# Patient Record
Sex: MALE | Race: WHITE | NOT HISPANIC OR LATINO | Employment: FULL TIME | URBAN - METROPOLITAN AREA
[De-identification: names, ages, dates, MRNs, and addresses within clinical notes are randomized per-mention and may not be internally consistent; named-entity substitution may affect disease eponyms.]

---

## 2018-06-18 LAB — HBA1C MFR BLD: >14 % (ref 0–5.6)

## 2019-04-01 ENCOUNTER — OFFICE VISIT - HEALTHEAST (OUTPATIENT)
Dept: INTERNAL MEDICINE | Facility: CLINIC | Age: 54
End: 2019-04-01

## 2019-04-01 DIAGNOSIS — E11.9 TYPE 2 DIABETES MELLITUS WITHOUT COMPLICATION, WITHOUT LONG-TERM CURRENT USE OF INSULIN (H): ICD-10-CM

## 2019-04-01 DIAGNOSIS — Z12.11 SCREEN FOR COLON CANCER: ICD-10-CM

## 2019-04-01 DIAGNOSIS — R05.9 COUGH: ICD-10-CM

## 2019-04-01 LAB
ANION GAP SERPL CALCULATED.3IONS-SCNC: 10 MMOL/L (ref 5–18)
BUN SERPL-MCNC: 20 MG/DL (ref 8–22)
CALCIUM SERPL-MCNC: 10 MG/DL (ref 8.5–10.5)
CHLORIDE BLD-SCNC: 93 MMOL/L (ref 98–107)
CO2 SERPL-SCNC: 29 MMOL/L (ref 22–31)
CREAT SERPL-MCNC: 1.35 MG/DL (ref 0.7–1.3)
ERYTHROCYTE [DISTWIDTH] IN BLOOD BY AUTOMATED COUNT: 12.8 % (ref 11–14.5)
GFR SERPL CREATININE-BSD FRML MDRD: 56 ML/MIN/1.73M2
GLUCOSE BLD-MCNC: 435 MG/DL (ref 70–125)
HCT VFR BLD AUTO: 41.5 % (ref 40–54)
HGB BLD-MCNC: 14.2 G/DL (ref 14–18)
MCH RBC QN AUTO: 27.8 PG (ref 27–34)
MCHC RBC AUTO-ENTMCNC: 34.3 G/DL (ref 32–36)
MCV RBC AUTO: 81 FL (ref 80–100)
PLATELET # BLD AUTO: 316 THOU/UL (ref 140–440)
PMV BLD AUTO: 7.4 FL (ref 7–10)
POTASSIUM BLD-SCNC: 4.9 MMOL/L (ref 3.5–5)
RBC # BLD AUTO: 5.12 MILL/UL (ref 4.4–6.2)
SODIUM SERPL-SCNC: 132 MMOL/L (ref 136–145)
WBC: 14.2 THOU/UL (ref 4–11)

## 2019-04-03 ENCOUNTER — COMMUNICATION - HEALTHEAST (OUTPATIENT)
Dept: INTERNAL MEDICINE | Facility: CLINIC | Age: 54
End: 2019-04-03

## 2019-04-03 LAB — HBA1C MFR BLD: 16 % (ref 4.2–6.1)

## 2019-04-04 ENCOUNTER — OFFICE VISIT - HEALTHEAST (OUTPATIENT)
Dept: INTERNAL MEDICINE | Facility: CLINIC | Age: 54
End: 2019-04-04

## 2019-04-04 DIAGNOSIS — Z79.4 TYPE 2 DIABETES MELLITUS WITH HYPERGLYCEMIA, WITH LONG-TERM CURRENT USE OF INSULIN (H): ICD-10-CM

## 2019-04-04 DIAGNOSIS — E11.65 TYPE 2 DIABETES MELLITUS WITH HYPERGLYCEMIA, WITH LONG-TERM CURRENT USE OF INSULIN (H): ICD-10-CM

## 2019-04-04 ASSESSMENT — MIFFLIN-ST. JEOR: SCORE: 1628.71

## 2019-04-08 ENCOUNTER — OFFICE VISIT - HEALTHEAST (OUTPATIENT)
Dept: EDUCATION SERVICES | Facility: CLINIC | Age: 54
End: 2019-04-08

## 2019-04-08 ENCOUNTER — OFFICE VISIT - HEALTHEAST (OUTPATIENT)
Dept: INTERNAL MEDICINE | Facility: CLINIC | Age: 54
End: 2019-04-08

## 2019-04-08 DIAGNOSIS — R05.9 COUGH: ICD-10-CM

## 2019-04-08 DIAGNOSIS — Z79.4 TYPE 2 DIABETES MELLITUS WITH HYPERGLYCEMIA, WITH LONG-TERM CURRENT USE OF INSULIN (H): ICD-10-CM

## 2019-04-08 DIAGNOSIS — E11.65 UNCONTROLLED TYPE 2 DIABETES MELLITUS WITH HYPERGLYCEMIA (H): ICD-10-CM

## 2019-04-08 DIAGNOSIS — R42 DIZZINESS: ICD-10-CM

## 2019-04-08 DIAGNOSIS — E11.65 TYPE 2 DIABETES MELLITUS WITH HYPERGLYCEMIA, WITHOUT LONG-TERM CURRENT USE OF INSULIN (H): ICD-10-CM

## 2019-04-08 DIAGNOSIS — E11.65 TYPE 2 DIABETES MELLITUS WITH HYPERGLYCEMIA, WITH LONG-TERM CURRENT USE OF INSULIN (H): ICD-10-CM

## 2019-04-08 DIAGNOSIS — J20.9 ACUTE BRONCHITIS, UNSPECIFIED ORGANISM: ICD-10-CM

## 2019-04-08 ASSESSMENT — MIFFLIN-ST. JEOR: SCORE: 1655.93

## 2019-04-23 ENCOUNTER — AMBULATORY - HEALTHEAST (OUTPATIENT)
Dept: INTERNAL MEDICINE | Facility: CLINIC | Age: 54
End: 2019-04-23

## 2019-04-23 ENCOUNTER — OFFICE VISIT - HEALTHEAST (OUTPATIENT)
Dept: INTERNAL MEDICINE | Facility: CLINIC | Age: 54
End: 2019-04-23

## 2019-04-23 DIAGNOSIS — F51.01 PRIMARY INSOMNIA: ICD-10-CM

## 2019-04-23 DIAGNOSIS — E11.65 TYPE 2 DIABETES MELLITUS WITH HYPERGLYCEMIA, WITH LONG-TERM CURRENT USE OF INSULIN (H): ICD-10-CM

## 2019-04-23 DIAGNOSIS — Z79.4 TYPE 2 DIABETES MELLITUS WITH HYPERGLYCEMIA, WITH LONG-TERM CURRENT USE OF INSULIN (H): ICD-10-CM

## 2019-04-23 ASSESSMENT — MIFFLIN-ST. JEOR: SCORE: 1674.07

## 2019-04-24 ENCOUNTER — COMMUNICATION - HEALTHEAST (OUTPATIENT)
Dept: INTERNAL MEDICINE | Facility: CLINIC | Age: 54
End: 2019-04-24

## 2019-05-29 ENCOUNTER — RECORDS - HEALTHEAST (OUTPATIENT)
Dept: ADMINISTRATIVE | Facility: OTHER | Age: 54
End: 2019-05-29

## 2019-06-04 ENCOUNTER — RECORDS - HEALTHEAST (OUTPATIENT)
Dept: HEALTH INFORMATION MANAGEMENT | Facility: CLINIC | Age: 54
End: 2019-06-04

## 2019-06-04 ENCOUNTER — OFFICE VISIT - HEALTHEAST (OUTPATIENT)
Dept: INTERNAL MEDICINE | Facility: CLINIC | Age: 54
End: 2019-06-04

## 2019-06-04 DIAGNOSIS — E11.65 TYPE 2 DIABETES MELLITUS WITH HYPERGLYCEMIA, WITH LONG-TERM CURRENT USE OF INSULIN (H): ICD-10-CM

## 2019-06-04 DIAGNOSIS — E78.2 MIXED HYPERLIPIDEMIA: ICD-10-CM

## 2019-06-04 DIAGNOSIS — Z79.4 TYPE 2 DIABETES MELLITUS WITH HYPERGLYCEMIA, WITH LONG-TERM CURRENT USE OF INSULIN (H): ICD-10-CM

## 2019-06-04 DIAGNOSIS — H00.012 HORDEOLUM EXTERNUM OF RIGHT LOWER EYELID: ICD-10-CM

## 2019-06-04 LAB
ALBUMIN SERPL-MCNC: 3.9 G/DL (ref 3.5–5)
ALP SERPL-CCNC: 116 U/L (ref 45–120)
ALT SERPL W P-5'-P-CCNC: 15 U/L (ref 0–45)
ANION GAP SERPL CALCULATED.3IONS-SCNC: 11 MMOL/L (ref 5–18)
AST SERPL W P-5'-P-CCNC: 11 U/L (ref 0–40)
BILIRUB DIRECT SERPL-MCNC: 0.2 MG/DL
BILIRUB SERPL-MCNC: 0.4 MG/DL (ref 0–1)
BUN SERPL-MCNC: 30 MG/DL (ref 8–22)
CALCIUM SERPL-MCNC: 10.1 MG/DL (ref 8.5–10.5)
CHLORIDE BLD-SCNC: 105 MMOL/L (ref 98–107)
CHOLEST SERPL-MCNC: 219 MG/DL
CO2 SERPL-SCNC: 23 MMOL/L (ref 22–31)
CREAT SERPL-MCNC: 1.13 MG/DL (ref 0.7–1.3)
CREAT UR-MCNC: 169.6 MG/DL
ERYTHROCYTE [DISTWIDTH] IN BLOOD BY AUTOMATED COUNT: 12.7 % (ref 11–14.5)
FASTING STATUS PATIENT QL REPORTED: YES
GFR SERPL CREATININE-BSD FRML MDRD: >60 ML/MIN/1.73M2
GLUCOSE BLD-MCNC: 220 MG/DL (ref 70–125)
HBA1C MFR BLD: 11.1 % (ref 3.5–6)
HCT VFR BLD AUTO: 44.2 % (ref 40–54)
HDLC SERPL-MCNC: 46 MG/DL
HGB BLD-MCNC: 15.2 G/DL (ref 14–18)
LDLC SERPL CALC-MCNC: 129 MG/DL
MCH RBC QN AUTO: 28.6 PG (ref 27–34)
MCHC RBC AUTO-ENTMCNC: 34.4 G/DL (ref 32–36)
MCV RBC AUTO: 83 FL (ref 80–100)
MICROALBUMIN UR-MCNC: 9.36 MG/DL (ref 0–1.99)
MICROALBUMIN/CREAT UR: 55.2 MG/G
PLATELET # BLD AUTO: 235 THOU/UL (ref 140–440)
PMV BLD AUTO: 7.8 FL (ref 7–10)
POTASSIUM BLD-SCNC: 4.7 MMOL/L (ref 3.5–5)
PROT SERPL-MCNC: 7.2 G/DL (ref 6–8)
RBC # BLD AUTO: 5.31 MILL/UL (ref 4.4–6.2)
SODIUM SERPL-SCNC: 139 MMOL/L (ref 136–145)
TRIGL SERPL-MCNC: 218 MG/DL
TSH SERPL DL<=0.005 MIU/L-ACNC: 1.56 UIU/ML (ref 0.3–5)
URATE SERPL-MCNC: 6.9 MG/DL (ref 3–8)
WBC: 8.9 THOU/UL (ref 4–11)

## 2019-06-04 ASSESSMENT — MIFFLIN-ST. JEOR: SCORE: 1660.46

## 2019-06-05 ENCOUNTER — COMMUNICATION - HEALTHEAST (OUTPATIENT)
Dept: INTERNAL MEDICINE | Facility: CLINIC | Age: 54
End: 2019-06-05

## 2019-06-05 DIAGNOSIS — E78.2 MIXED HYPERLIPIDEMIA: ICD-10-CM

## 2019-06-06 ENCOUNTER — COMMUNICATION - HEALTHEAST (OUTPATIENT)
Dept: INTERNAL MEDICINE | Facility: CLINIC | Age: 54
End: 2019-06-06

## 2019-10-01 ENCOUNTER — OFFICE VISIT - HEALTHEAST (OUTPATIENT)
Dept: INTERNAL MEDICINE | Facility: CLINIC | Age: 54
End: 2019-10-01

## 2019-10-01 DIAGNOSIS — E78.2 MIXED HYPERLIPIDEMIA: ICD-10-CM

## 2019-10-01 DIAGNOSIS — Z23 NEED FOR ZOSTER VACCINATION: ICD-10-CM

## 2019-10-01 DIAGNOSIS — I10 ESSENTIAL HYPERTENSION: ICD-10-CM

## 2019-10-01 DIAGNOSIS — E11.9 TYPE 2 DIABETES MELLITUS WITHOUT COMPLICATION, WITH LONG-TERM CURRENT USE OF INSULIN (H): ICD-10-CM

## 2019-10-01 DIAGNOSIS — Z79.4 TYPE 2 DIABETES MELLITUS WITHOUT COMPLICATION, WITH LONG-TERM CURRENT USE OF INSULIN (H): ICD-10-CM

## 2019-10-01 LAB
ALBUMIN SERPL-MCNC: 3.9 G/DL (ref 3.5–5)
ALP SERPL-CCNC: 93 U/L (ref 45–120)
ALT SERPL W P-5'-P-CCNC: 25 U/L (ref 0–45)
ANION GAP SERPL CALCULATED.3IONS-SCNC: 11 MMOL/L (ref 5–18)
AST SERPL W P-5'-P-CCNC: 21 U/L (ref 0–40)
BILIRUB DIRECT SERPL-MCNC: 0.2 MG/DL
BILIRUB SERPL-MCNC: 0.5 MG/DL (ref 0–1)
BUN SERPL-MCNC: 27 MG/DL (ref 8–22)
CALCIUM SERPL-MCNC: 10.4 MG/DL (ref 8.5–10.5)
CHLORIDE BLD-SCNC: 103 MMOL/L (ref 98–107)
CHOLEST SERPL-MCNC: 155 MG/DL
CO2 SERPL-SCNC: 24 MMOL/L (ref 22–31)
CREAT SERPL-MCNC: 1.4 MG/DL (ref 0.7–1.3)
FASTING STATUS PATIENT QL REPORTED: NORMAL
GFR SERPL CREATININE-BSD FRML MDRD: 53 ML/MIN/1.73M2
GLUCOSE BLD-MCNC: 176 MG/DL (ref 70–125)
HBA1C MFR BLD: 12.9 % (ref 3.5–6)
HDLC SERPL-MCNC: 49 MG/DL
LDLC SERPL CALC-MCNC: 77 MG/DL
POTASSIUM BLD-SCNC: 5.4 MMOL/L (ref 3.5–5)
PROT SERPL-MCNC: 7.6 G/DL (ref 6–8)
SODIUM SERPL-SCNC: 138 MMOL/L (ref 136–145)
TRIGL SERPL-MCNC: 146 MG/DL
TSH SERPL DL<=0.005 MIU/L-ACNC: 0.86 UIU/ML (ref 0.3–5)

## 2019-10-01 ASSESSMENT — MIFFLIN-ST. JEOR: SCORE: 1655.93

## 2019-10-08 ENCOUNTER — COMMUNICATION - HEALTHEAST (OUTPATIENT)
Dept: INTERNAL MEDICINE | Facility: CLINIC | Age: 54
End: 2019-10-08

## 2019-10-18 ENCOUNTER — RECORDS - HEALTHEAST (OUTPATIENT)
Dept: ADMINISTRATIVE | Facility: OTHER | Age: 54
End: 2019-10-18

## 2019-10-30 ENCOUNTER — RECORDS - HEALTHEAST (OUTPATIENT)
Dept: HEALTH INFORMATION MANAGEMENT | Facility: CLINIC | Age: 54
End: 2019-10-30

## 2019-12-19 ENCOUNTER — OFFICE VISIT - HEALTHEAST (OUTPATIENT)
Dept: INTERNAL MEDICINE | Facility: CLINIC | Age: 54
End: 2019-12-19

## 2019-12-19 ENCOUNTER — COMMUNICATION - HEALTHEAST (OUTPATIENT)
Dept: INTERNAL MEDICINE | Facility: CLINIC | Age: 54
End: 2019-12-19

## 2019-12-19 ENCOUNTER — RECORDS - HEALTHEAST (OUTPATIENT)
Dept: ADMINISTRATIVE | Facility: OTHER | Age: 54
End: 2019-12-19

## 2019-12-19 DIAGNOSIS — Z79.4 TYPE 2 DIABETES MELLITUS WITHOUT COMPLICATION, WITH LONG-TERM CURRENT USE OF INSULIN (H): ICD-10-CM

## 2019-12-19 DIAGNOSIS — Z23 NEED FOR IMMUNIZATION AGAINST INFLUENZA: ICD-10-CM

## 2019-12-19 DIAGNOSIS — Z23 NEED FOR ZOSTER VACCINATION: ICD-10-CM

## 2019-12-19 DIAGNOSIS — Z00.00 ROUTINE GENERAL MEDICAL EXAMINATION AT A HEALTH CARE FACILITY: ICD-10-CM

## 2019-12-19 DIAGNOSIS — E11.9 TYPE 2 DIABETES MELLITUS WITHOUT COMPLICATION, WITH LONG-TERM CURRENT USE OF INSULIN (H): ICD-10-CM

## 2019-12-19 DIAGNOSIS — E78.2 MIXED HYPERLIPIDEMIA: ICD-10-CM

## 2019-12-19 DIAGNOSIS — I10 ESSENTIAL HYPERTENSION: ICD-10-CM

## 2019-12-19 LAB
ANION GAP SERPL CALCULATED.3IONS-SCNC: 9 MMOL/L (ref 5–18)
BUN SERPL-MCNC: 21 MG/DL (ref 8–22)
CALCIUM SERPL-MCNC: 9.5 MG/DL (ref 8.5–10.5)
CHLORIDE BLD-SCNC: 104 MMOL/L (ref 98–107)
CO2 SERPL-SCNC: 25 MMOL/L (ref 22–31)
CREAT SERPL-MCNC: 1.43 MG/DL (ref 0.7–1.3)
GFR SERPL CREATININE-BSD FRML MDRD: 52 ML/MIN/1.73M2
GLUCOSE BLD-MCNC: 174 MG/DL (ref 70–125)
POTASSIUM BLD-SCNC: 4.7 MMOL/L (ref 3.5–5)
PSA SERPL-MCNC: 0.4 NG/ML (ref 0–3.5)
RETINOPATHY: POSITIVE
SODIUM SERPL-SCNC: 138 MMOL/L (ref 136–145)

## 2019-12-19 ASSESSMENT — MIFFLIN-ST. JEOR: SCORE: 1651.39

## 2019-12-20 LAB — HBA1C MFR BLD: 13.8 % (ref 4.2–6.1)

## 2019-12-23 ENCOUNTER — COMMUNICATION - HEALTHEAST (OUTPATIENT)
Dept: INTERNAL MEDICINE | Facility: CLINIC | Age: 54
End: 2019-12-23

## 2019-12-30 ENCOUNTER — RECORDS - HEALTHEAST (OUTPATIENT)
Dept: HEALTH INFORMATION MANAGEMENT | Facility: CLINIC | Age: 54
End: 2019-12-30

## 2020-03-30 ENCOUNTER — COMMUNICATION - HEALTHEAST (OUTPATIENT)
Dept: INTERNAL MEDICINE | Facility: CLINIC | Age: 55
End: 2020-03-30

## 2020-05-05 ENCOUNTER — VIRTUAL VISIT (OUTPATIENT)
Dept: ENDOCRINOLOGY | Facility: CLINIC | Age: 55
End: 2020-05-05
Payer: COMMERCIAL

## 2020-05-05 DIAGNOSIS — E11.65 TYPE 2 DIABETES MELLITUS WITH HYPERGLYCEMIA, WITH LONG-TERM CURRENT USE OF INSULIN (H): Primary | ICD-10-CM

## 2020-05-05 DIAGNOSIS — Z79.4 TYPE 2 DIABETES MELLITUS WITH HYPERGLYCEMIA, WITH LONG-TERM CURRENT USE OF INSULIN (H): Primary | ICD-10-CM

## 2020-05-05 PROCEDURE — 99203 OFFICE O/P NEW LOW 30 MIN: CPT | Mod: 95 | Performed by: INTERNAL MEDICINE

## 2020-05-05 RX ORDER — INSULIN PUMP SYRINGE, 3 ML
EACH MISCELLANEOUS
COMMUNITY
Start: 2018-06-25 | End: 2020-05-05

## 2020-05-05 RX ORDER — METFORMIN HCL 500 MG
1000 TABLET, EXTENDED RELEASE 24 HR ORAL
COMMUNITY
Start: 2018-06-25 | End: 2020-05-05

## 2020-05-05 RX ORDER — ATORVASTATIN CALCIUM 20 MG/1
20 TABLET, FILM COATED ORAL
COMMUNITY
Start: 2019-06-05

## 2020-05-05 RX ORDER — LISINOPRIL 5 MG/1
5 TABLET ORAL
COMMUNITY
Start: 2019-10-01

## 2020-05-05 RX ORDER — INSULIN GLARGINE 100 [IU]/ML
30 INJECTION, SOLUTION SUBCUTANEOUS DAILY
COMMUNITY
Start: 2020-03-27 | End: 2020-05-05

## 2020-05-05 RX ORDER — INSULIN GLARGINE 100 [IU]/ML
40 INJECTION, SOLUTION SUBCUTANEOUS DAILY
Qty: 30 ML | Refills: 1 | Status: SHIPPED | OUTPATIENT
Start: 2020-05-05

## 2020-05-05 RX ORDER — METFORMIN HCL 500 MG
2000 TABLET, EXTENDED RELEASE 24 HR ORAL
Start: 2020-05-05

## 2020-05-05 RX ORDER — PEN NEEDLE, DIABETIC 31 GX5/16"
NEEDLE, DISPOSABLE MISCELLANEOUS
COMMUNITY
Start: 2019-12-19

## 2020-05-05 RX ORDER — LANCETS
EACH MISCELLANEOUS
COMMUNITY
Start: 2019-12-19

## 2020-05-05 NOTE — PROGRESS NOTES
"This is a telephone encounter.  NEW PATIENT  Patient declined video visit.    Start time: 3:10    End time: 3:35    More than 20 min spent reviewing chart, labs, results, imaging studies and in patient communication.    In the setting of COVID-19 outbreak patients visits are transitioned to phone visits/ virtual visits as per system and leadership guidelines.  I have personally reviewed data including notes, lab tests. I have reviewed and interpreted images personally.  This encounter is potentially equivalent to NEW 4 level (02512) clinic visit.    The patient has been notified of following:      \"This telephone visit will be conducted via a call between you and your physician/provider. We have found that certain health care needs can be provided without the need for a physical exam.  This service lets us provide the care you need with a short phone conversation.  If a prescription is necessary we can send it directly to your pharmacy.  If lab work is needed we can place an order for that and you can then stop by our lab to have the test done at a later time.     We will bill your insurance company for this service.  Please check with your medical insurance if this type of visit is covered. You may be responsible for the cost of this type of visit if insurance coverage is denied.  The typical cost is $30 (10min), $59 (11-20min) and $85 (21-30min).  Most often these visits are shorter than 10 minutes. With new updates with corona virus patient might be billed as clinic visit.     If during the course of the call the physician/provider feels a telephone visit is not appropriate, you will not be charged for this service.\"      Past medical history, social history, family history, allergy and medications were reviewed and updated as appropriate.  Reviewed all pertinent labs, notes and imaging studies as appropriate.    Patient verbally consented to phone visit today: yes.    Disclaimer: This note consists of symbols " derived from keyboarding, dictation and/or voice recognition software. As a result, there may be errors in the script that have gone undetected. Please consider this when interpreting information found in this chart.      ROS neg expect noted in notes.  Patient feels well at this time and denies any tachycardia, palpitations, heat intolerance, tremor, insomnia, diarrhea, or unexplained weight loss.  Patient also denies  cold intolerance, constipation, or unexplained weight gain.   ENDOCRINOLOGY CLINIC NOTE:  Name: Andrea Godfrey  Seen in consultation with  ([facility]) for Diabetes.  HPI:  Andrea Godfrey is a 55 year old male who presents for the evaluation/management of Diabetes.   has a past medical history of Diabetes (H)., HTN, Dyslipidemia.    1. Type 2 DM:  Orginally diagnosed at the age of: In 30s and is on insulin X about 10 years.  Current Regimen:   yes:     Diabetes Medication(s)     Biguanides       metFORMIN (GLUCOPHAGE-XR) 500 MG 24 hr tablet    Take 1,000 mg by mouth    Insulin       LANTUS SOLOSTAR 100 UNIT/ML soln    30 Units daily        BS checks: 1-2 times/day  Average Meter Download: reviewed. See nursing note.  Exercise: Active at work. Works as maintainance .  Last A1c: 13.8%  Symptoms of hypoglycemia (low blood sugar):  Gets symptoms of hypoglycemia.  Episodes of hypoglycemia: No.    DM Complications:   Complications:   Diabetes Complications  Description / Detail    Diabetic Retinopathy  No   CAD / PAD  No   Neuropathy  No   Nephropathy / Microalbuminuria  Mild CKD  + microalbuminuria     Gastroparesis  No   Hypoglycemia [place]  No       2. Hypertension:    on medications  3. Hyperlipidemia: on medications    PMH/PSH:  Past Medical History:   Diagnosis Date     Diabetes (H)      Family Hx:  Diabetes: sister    Social Hx:  Social History     Socioeconomic History     Marital status:      Spouse name: Not on file     Number of children: Not on file      Years of education: Not on file     Highest education level: Not on file   Occupational History     Not on file   Social Needs     Financial resource strain: Not on file     Food insecurity     Worry: Not on file     Inability: Not on file     Transportation needs     Medical: Not on file     Non-medical: Not on file   Tobacco Use     Smoking status: Never Smoker     Smokeless tobacco: Never Used   Substance and Sexual Activity     Alcohol use: Yes     Comment: occas     Drug use: Never     Sexual activity: Yes     Partners: Female   Lifestyle     Physical activity     Days per week: Not on file     Minutes per session: Not on file     Stress: Not on file   Relationships     Social connections     Talks on phone: Not on file     Gets together: Not on file     Attends Islam service: Not on file     Active member of club or organization: Not on file     Attends meetings of clubs or organizations: Not on file     Relationship status: Not on file     Intimate partner violence     Fear of current or ex partner: Not on file     Emotionally abused: Not on file     Physically abused: Not on file     Forced sexual activity: Not on file   Other Topics Concern     Not on file   Social History Narrative     Not on file          MEDICATIONS:  has a current medication list which includes the following prescription(s): atorvastatin, contour next test, fifty50 pen needles, lantus solostar, lisinopril, metformin, microlet lancets, and trueplus pen needles.    ROS     ROS: 10 point ROS neg other than the symptoms noted above in the HPI.    Physical Exam   VS: There were no vitals taken for this visit.    LABS:  A1c:  12/2019: A1c 13.8 %  No results found for: A1C    Creatinine:  No results found for: CR]    Urine Micro:  No results found for: MICROL  No results found for: MICROALBUMIN      LFTs/Lipids:  No results found for: CHOL  No results found for: HDL  No results found for: LDL  No results found for: TRIG  No results found  for: CHOLHDLRATIO    TFTs:  No results found for: TSH]    05/05   800a    170  05/04   820p    212  05/03   800a    161  05/02   820p    198  05/01   Am       159              Pm       207  04/31   am       165  04/30   pm       201                    Am       152    All pertinent notes, labs, and images personally reviewed by me.     Glucometer/ insulin pump (if applicable)/ CGM data (if applicable) downloaded, Personally reviewed and interpreted.  All Blood sugar data reviewed personally and discussed with pt.  See nursing note from 5/5/2020 for details of BG/CGM log.    A/P  Mr.Richard JOANNA Godfrey is a 55 year old here for the evaluation/management of diabetes:    1. DM2 - Under Poor control.  A1c 13.8 % in 12/2019. Historically DM under poor control. DM complicated by microalbuminuria and mild CKD.  BG mostly high.  No major episodes of hypoglycemia noted/reported.   He understands that he needs to work on diet.  Plan:  Discussed diagnosis, pathophysiology, management and treatment options of condition with pt.  I also discussed importance of strict blood sugar control to prevent complications associated with uncontrolled diabetes.  Increase Lantus to 40 Units/day.  Continue Metformin 2000 mg/day.  Labs needed.  Consider GLP-1 RA if BG are still high.  He will work on diet and lifestyle changes.  Discussed CDE referral but he reports that he understands the diet.  Follow up in 6 weeks.    2. Hypertension - On lisinopril.    3. Hyperlipidemia - On Atorvastatin.     4. Prevention:  Opthalmology- 2019. No DR per his report.  ASA- ? Will discuss in next visit.  Smoking- no    Most Recent Immunizations   Administered Date(s) Administered     DTaP, Unspecified 05/01/2009     Flu, Unspecified 10/19/2019     HepA-Adult 11/24/1997     HepB-Adult 10/20/2015     Mantoux Tuberculin Skin Test 08/19/2014     Zoster vaccine recombinant adjuvanted (SHINGRIX) 12/19/2019         Recommend checking blood sugars before meals and at  bedtime.    If Blood glucose are low more often-> 2-3 times/week- give us a call.  The patient is advised to Make better food choices: reduce carbs, Reduce portion size, weight loss and exercise 3-4 times a week.  Discussed hypoglycemia signs and symptoms as well as management in detail.    There is some variability among people, most will usually develop symptoms suggestive of hypoglycemia when blood glucose levels are lowered to the mid 60's. The first set of symptoms are called adrenergic. Patients may experience any of the following nervousness, sweating, intense hunger, trembling, weakness, palpitations, and difficulty speaking.   The acute management of hypoglycemia involves the rapid delivery of a source of easily absorbed sugar. Regular soda, juice, lifesavers, table sugar, are good options. 15 grams of glucose is the dose that is given, followed by an assessment of symptoms and a blood glucose check if possible. If after 10 minutes there is no improvement, another 10-15 grams should be given. This can be repeated up to three times. The equivalency of 10-15 grams of glucose (approximate servings) are: Four lifesavers, 4 teaspoons of sugar, or 1/2 can of regular soda or juice.     Meds ordered today:   Orders Placed This Encounter   Medications     LANTUS SOLOSTAR 100 UNIT/ML soln     Si Units daily     If Lantus is not covered by insurance, may substitute Basaglar at same dose and frequency.       metFORMIN (GLUCOPHAGE-XR) 500 MG 24 hr tablet     Sig: Take 1,000 mg by mouth     lisinopril (ZESTRIL) 5 MG tablet     Sig: Take 5 mg by mouth     atorvastatin (LIPITOR) 20 MG tablet     Sig: Take 20 mg by mouth     DISCONTD: Blood Glucose Monitoring Suppl (FIFTY50 GLUCOSE METER 2.0) w/Device KIT     Sig: Accu Chek Aggie or Deanna E11.65 NIDDM type II, uncontrolled - Test 3 times/day, Reason: High A1c, New Meds, Unstable DM     CONTOUR NEXT TEST test strip     insulin pen needle (FIFTY50 PEN NEEDLES) 31G X 8 MM  miscellaneous     Sig: Use one per day for lantus     TRUEPLUS PEN NEEDLES 31G X 8 MM miscellaneous     Microlet Lancets MISC       Medications Discontinued During This Encounter   Medication Reason     Blood Glucose Monitoring Suppl (FIFTY50 GLUCOSE METER 2.0) w/Device KIT      All questions were answered.  The patient indicates understanding of the above issues and agrees with the plan set forth.     Follow-up:  6 weeks.    Kalee Coates M.D  Endocrinology  [facility]  CC: [facility]    Disclaimer: This note consists of symbols derived from keyboarding, dictation and/or voice recognition software. As a result, there may be errors in the script that have gone undetected. Please consider this when interpreting information found in this chart.    Addendum to above note and clinic visit:    Labs reviewed.    See result note/telephone encounter.

## 2020-05-05 NOTE — NURSING NOTE
05/05 800a 170  05/04 820p 212  05/03 800a 161  05/02 820p 198  05/01 Am 159   Pm 207  04/31 am 165  04/30 pm 201    Am 152    Vanessa Canchola CMA  [facility]  [facility]

## 2020-11-05 ENCOUNTER — COMMUNICATION - HEALTHEAST (OUTPATIENT)
Dept: INTERNAL MEDICINE | Facility: CLINIC | Age: 55
End: 2020-11-05

## 2020-11-05 DIAGNOSIS — E78.2 MIXED HYPERLIPIDEMIA: ICD-10-CM

## 2020-11-05 DIAGNOSIS — I10 ESSENTIAL HYPERTENSION: ICD-10-CM

## 2021-01-21 ENCOUNTER — RECORDS - HEALTHEAST (OUTPATIENT)
Dept: ADMINISTRATIVE | Facility: OTHER | Age: 56
End: 2021-01-21

## 2021-01-21 LAB — RETINOPATHY: POSITIVE

## 2021-02-02 ENCOUNTER — RECORDS - HEALTHEAST (OUTPATIENT)
Dept: HEALTH INFORMATION MANAGEMENT | Facility: CLINIC | Age: 56
End: 2021-02-02

## 2021-05-27 NOTE — PROGRESS NOTES
Office Visit - Follow Up   Andrea Godfrey Jr.   53 y.o. male    Date of Visit: 4/4/2019    Chief Complaint   Patient presents with     Diabetes     elevated blood sugars. Bs yesterday was 215, have not checked today. Has not taken Metformin everyday        Assessment and Plan   1. Type 2 diabetes mellitus with hyperglycemia, without long-term current use of insulin (H)  Came to see me for the first time on April 1, 2019 for cough.  Was treated with levofloxacin for his cough.  But his  labs showed that he had markedly increased random blood sugar of 435 and  markedly elevated A1c almost tripled from normal to 16.  Was recalled.  Has not been compliant taking his metformin  XR 1000 mg daily and stopped using his glargine insulin.  Came today for his uncontrolled diabetes.  Was advised to continue with his metformin XR 1000 mg daily and resume Lantus at 10 units at bedtime.  Advised to check his blood sugar twice a day and record for me.  Reports t his blood sugar yesterday morning was 215.  Will refer him to our diabetic educator for close follow-up of his diabetes.  - insulin glargine (LANTUS) 100 unit/mL injection; Inject 10 Units under the skin at bedtime. 11.65 Type 2 with hyperglycemia  Dispense: 10 mL; Refill: PRN  - Ambulatory referral to Diabetic Education Program (CDE)      Follow upa schedule in 1 week.     History of Present Illness   This 53 y.o. old male came for the first time on 4/1/2019 because of cough.  Was also found that he has been diabetic.  Wife reports he was not compliant taking his metformin.  Did not tell me during that visit  he was also getting glargine which he stopped on his own.  He had a random blood sugar of 435 and markedly elevated A1c of 16.  He was recalled so he came to see me today.  Reports that his blood sugar yesterday morning was 215.  Except that he is not compliant taking his metformin and stop getting glargine.  Feeling better now in terms of his cough.   "Responding to  levofloxacin.  Overall, stable.  No complaints.    Review of Systems   A 12 point comprehensive review of systems was negative except as noted..     Medications, Allergies and Problem List   Reviewed and updated             Chief Complaint   Diabetes (elevated blood sugars. Bs yesterday was 215, have not checked today. Has not taken Metformin everyday)       Patient Profile   Social History     Social History Narrative    , no children. Works for [employer]. Nonsmoker. Socially drinks alcohol.         Past Medical History   There is no problem list on file for this patient.      Past Surgical History  He has no past surgical history on file.       Medications and Allergies   Current Outpatient Medications   Medication Sig     blood glucose test (ACCU-CHEK SMARTVIEW TEST STRIP) strips Dispense item covered by pt ins. 250.02 NIDDM type II, uncontrolled - Test 3 times/day. Reason: High A1C     codeine-guaiFENesin (GUAIFENESIN AC)  mg/5 mL liquid Take 5 mL by mouth 3 (three) times a day as needed for cough.     CONTOUR NEXT EZ METER Misc      CONTOUR NEXT TEST STRIPS strips      levoFLOXacin (LEVAQUIN) 500 MG tablet Take 1 tablet (500 mg total) by mouth daily for 10 days.     metFORMIN (GLUCOPHAGE-XR) 500 MG 24 hr tablet Take 1,000 mg by mouth daily with breakfast.            insulin glargine (LANTUS) 100 unit/mL injection Inject 10 Units under the skin at bedtime. 11.65 Type 2 with hyperglycemia     No Known Allergies     Family and Social History   No family history on file.     Social History     Tobacco Use     Smoking status: Never Smoker     Smokeless tobacco: Never Used   Substance Use Topics     Alcohol use: Not on file     Drug use: Not on file        Physical Exam       Physical Exam  /64   Pulse 79   Ht 5' 9\" (1.753 m)   Wt 176 lb (79.8 kg)   SpO2 99%   BMI 25.99 kg/m    General appearance: alert, appears stated age, cooperative and no distress  Head: " Normocephalic, without obvious abnormality, atraumatic  Throat: lips, mucosa, and tongue normal; teeth and gums normal  Neck: no adenopathy, no carotid bruit, no JVD, supple, symmetrical, trachea midline and thyroid not enlarged, symmetric, no tenderness/mass/nodules  Lungs: clear to auscultation bilaterally  Heart: regular rate and rhythm, S1, S2 normal, no murmur, click, rub or gallop  Abdomen: soft, non-tender; bowel sounds normal; no masses,  no organomegaly  Extremities: extremities normal, atraumatic, no cyanosis or edema  Skin: Skin color, texture, turgor normal. No rashes or lesions     Additional Information        Stan Hoang MD  Internal Medicine  Contact me at [phone]     Additional Information   Current Outpatient Medications   Medication Sig     blood glucose test (ACCU-CHEK SMARTVIEW TEST STRIP) strips Dispense item covered by pt ins. 250.02 NIDDM type II, uncontrolled - Test 3 times/day. Reason: High A1C     codeine-guaiFENesin (GUAIFENESIN AC)  mg/5 mL liquid Take 5 mL by mouth 3 (three) times a day as needed for cough.     CONTOUR NEXT EZ METER Misc      CONTOUR NEXT TEST STRIPS strips      levoFLOXacin (LEVAQUIN) 500 MG tablet Take 1 tablet (500 mg total) by mouth daily for 10 days.     metFORMIN (GLUCOPHAGE-XR) 500 MG 24 hr tablet Take 1,000 mg by mouth daily with breakfast.            insulin glargine (LANTUS) 100 unit/mL injection Inject 10 Units under the skin at bedtime. 11.65 Type 2 with hyperglycemia     No Known Allergies  Social History     Tobacco Use     Smoking status: Never Smoker     Smokeless tobacco: Never Used   Substance Use Topics     Alcohol use: Not on file     Drug use: Not on file         Time: total time spent with the patient was 25 minutes of which >50% was spent in counseling and coordination of care

## 2021-05-27 NOTE — PATIENT INSTRUCTIONS - HE
1. Eat 3 balanced meals each day - Monitor carb intake and limit to 60-75 grams per meal  This would be equal to 4-5 choices ~  1 choice = 15 grams    Do not wait longer than 4-5 hours to eat something  Snacks limit to no more than 30 grams of carbohydrates or 2 choices  Make sure you include protein source with each meal and at bedtime - this has been shown to help with blood glucose elevations    2. Check blood sugars 2-3  times each day  - when you wake up  - before dinner  - 2 hours after dinner   Fasting and before meal target is 80 - 130   2 hours after a meal target is < 180  remember to bring meter and log book to all appointments    3. Incorporate 30 minutes activity into each day - does not need to be all at one time & walking counts    4. Take diabetes medications as prescribed Metformin 3 tabs each day - hoping Dr Hoang will increase this to 4 tablets  Lantus 10 units currently - recommend increasing this to 16 units to begin with - and increasing by 2 units every other day until morning blood sugars are closer to 130 range  I will leave this recommendation in my chart notes     Follow up in a month or two with diabetes care at [facility]

## 2021-05-27 NOTE — TELEPHONE ENCOUNTER
Patient Returning Call  Reason for call:  Patient called back.  Information relayed to patient:  Informed of message listed below.  Patient states understanding and agrees with plan.  Patient has additional questions:  No  If YES, what are your questions/concerns:    Okay to leave a detailed message?: No call back needed

## 2021-05-27 NOTE — PROGRESS NOTES
Office Visit - Follow Up   Andrea Godfrey Jr.   54 y.o. male    Date of Visit: 4/8/2019    Chief Complaint   Patient presents with     Follow-up     bronchial infection from visit from last Monday     Dizziness        Assessment and Plan   1. Type 2 diabetes mellitus with hyperglycemia, with long-term current use of insulin (H)  Uncontrolled.  A1c was 16 on his first visit with me on April 1..  At that time he also complains of cough which is now improved.  Was referred to our diabetic educator for  adjustment of his insulin.  Lantus was reinstituted together with his usual metformin on his second visit with me on April 4.  Now his Lantus was increased from 10 to 16 units and additional increase of his Lantus every 2 days x 2 units until he gets blood sugar  to be less than  130.  Also will continue to check his blood sugar to 3 times a day and record until his next visit.  Was also advised by the diabetic educator to increase his metformin to 1000 mg twice a day which is fine with me.    2. Dizziness  Gets still dizzy intermittently especially with prolonged standing.  But  resolves with sitting or resting.  Advised to rest and take it easy.  Cannot return to work yet until he is fully back to his baseline.    3. Acute bronchitis, unspecified organism  Now resolved.  But will finish levofloxacin in 2 days.    4. Cough  Not coughing as much anymore.  Takes guaifenesin with codeine  as needed and helps.  - codeine-guaiFENesin (GUAIFENESIN AC)  mg/5 mL liquid; Take 5 mL by mouth 3 (three) times a day as needed for cough.  Dispense: 120 mL; Refill: 0    Advised he cannot report for work yet until his next visit in 2 weeks for reevaluation.  So he will be off again additional 2 weeks.  Will complete his FMLA form.      Follow up in 2 weeks.     History of Present Illness   This 54 y.o. old male is here for follow-up.  Was seen on April 1 for continuing cough.  Cough is  resolving with levofloxacin.   Finishing 2 more days of levofloxacin.  But at the same time was found to have uncontrolled diabetes with a blood sugar in the 400 range and A1c of 16.  Was  recalled 2 days later for his uncontrolled diabetes.  Was reinstituted with his Lantus and subsequently referred to our diabetic educator.  Was continued on his metformin.  Saw diabetic educator today and his Lantus was increased from 10  to16 units daily and to increase by 2 units every 2 days until his blood sugar gets to less than 130.  Still dizzy because of his acute bronchitis and his uncontrolled diabetes.  Gets only dizzy when he stands for prolonged period of time and resolves with sitting down and resting.  Overall, stable but not back to his baseline yet.  Still unable to go back to work until he is back to his usual self.    Review of Systems   A 12 point comprehensive review of systems was negative except as noted..     Medications, Allergies and Problem List   Reviewed and updated             Chief Complaint   Follow-up (bronchial infection from visit from last Monday) and Dizziness       Patient Profile   Social History     Social History Narrative    , no children. Works for [employer]. Nonsmoker. Socially drinks alcohol.         Past Medical History   Patient Active Problem List   Diagnosis     Type 2 diabetes mellitus with hyperglycemia, with long-term current use of insulin (H)       Past Surgical History  He has no past surgical history on file.       Medications and Allergies   Current Outpatient Medications   Medication Sig     blood glucose test (ACCU-CHEK SMARTVIEW TEST STRIP) strips Dispense item covered by pt ins. 250.02 NIDDM type II, uncontrolled - Test 3 times/day. Reason: High A1C     CONTOUR NEXT EZ METER Misc      CONTOUR NEXT TEST STRIPS strips      insulin glargine (LANTUS) 100 unit/mL injection Inject 10 Units under the skin at bedtime. 11.65 Type 2 with hyperglycemia     levoFLOXacin (LEVAQUIN) 500 MG  "tablet Take 1 tablet (500 mg total) by mouth daily for 10 days.     metFORMIN (GLUCOPHAGE-XR) 500 MG 24 hr tablet Take 1,000 mg by mouth daily with breakfast.            codeine-guaiFENesin (GUAIFENESIN AC)  mg/5 mL liquid Take 5 mL by mouth 3 (three) times a day as needed for cough.     No Known Allergies     Family and Social History   No family history on file.     Social History     Tobacco Use     Smoking status: Never Smoker     Smokeless tobacco: Never Used   Substance Use Topics     Alcohol use: Not on file     Drug use: Not on file        Physical Exam       Physical Exam  /70   Pulse 85   Ht 5' 9\" (1.753 m)   Wt 182 lb (82.6 kg)   SpO2 98%   BMI 26.88 kg/m    General appearance: alert, appears stated age, cooperative and no distress  Head: Normocephalic, without obvious abnormality, atraumatic  Throat: lips, mucosa, and tongue normal; teeth and gums normal  Neck: no adenopathy, no carotid bruit, no JVD, supple, symmetrical, trachea midline and thyroid not enlarged, symmetric, no tenderness/mass/nodules  Lungs: clear to auscultation bilaterally  Heart: regular rate and rhythm, S1, S2 normal, no murmur, click, rub or gallop  Abdomen: soft, non-tender; bowel sounds normal; no masses,  no organomegaly  Extremities: extremities normal, atraumatic, no cyanosis or edema  Skin: Skin color, texture, turgor normal. No rashes or lesions  Neurologic: Grossly normal     Additional Information        Stan Hoang MD  Internal Medicine  Contact me at [phone]     Additional Information   Current Outpatient Medications   Medication Sig     blood glucose test (ACCU-CHEK SMARTVIEW TEST STRIP) strips Dispense item covered by pt ins. 250.02 NIDDM type II, uncontrolled - Test 3 times/day. Reason: High A1C     CONTOUR NEXT EZ METER Misc      CONTOUR NEXT TEST STRIPS strips      insulin glargine (LANTUS) 100 unit/mL injection Inject 10 Units under the skin at bedtime. 11.65 Type 2 with hyperglycemia "     levoFLOXacin (LEVAQUIN) 500 MG tablet Take 1 tablet (500 mg total) by mouth daily for 10 days.     metFORMIN (GLUCOPHAGE-XR) 500 MG 24 hr tablet Take 1,000 mg by mouth daily with breakfast.            codeine-guaiFENesin (GUAIFENESIN AC)  mg/5 mL liquid Take 5 mL by mouth 3 (three) times a day as needed for cough.     No Known Allergies  Social History     Tobacco Use     Smoking status: Never Smoker     Smokeless tobacco: Never Used   Substance Use Topics     Alcohol use: Not on file     Drug use: Not on file         Time: total time spent with the patient was 25 minutes of which >50% was spent in counseling and coordination of care

## 2021-05-27 NOTE — TELEPHONE ENCOUNTER
Left message to call back for: patient to call back regarding recent lab work  Information to relay to patient:  Patient needs to be seen this Thursday to discuss elevated blood sugar based on recent lab work. Per Md.     Please assist with appt.     Danii Natarajan LPN

## 2021-05-27 NOTE — PROGRESS NOTES
Office Visit - New Patient   Andrea Godfrey Jr.   53 y.o.  male    Date of visit: 4/1/2019  Physician: Stan Hoang MD     Assessment and Plan   1. Cough  He has been coughing for several weeks now.  Apparently was seen 4 times in an urgent care and was at [facility].  At [facility] ER was found he had influenza since is  rapid influenza  swab was positive for influenza A.  Was not treated for his influenza with Tamiflu and did not get antibiotic for his cough.  Had normal CBC and chest x-ray on his last visit at [facility] ER on 3/21/2019.  Currently feels rundown tired.  Continues to cough.  Also gets short of breath intermittently. Cough is productive.  Denies fever.  Will treat with levofloxacin daily for 10 days and codeine  and guaifenesin as needed.  Check CBC.  - levoFLOXacin (LEVAQUIN) 500 MG tablet; Take 1 tablet (500 mg total) by mouth daily for 10 days.  Dispense: 10 tablet; Refill: 0  - HM2(CBC w/o Differential)  - codeine-guaiFENesin (GUAIFENESIN AC)  mg/5 mL liquid; Take 5 mL by mouth 3 (three) times a day as needed for cough.  Dispense: 120 mL; Refill: 0    2. Type 2 diabetes mellitus without complication, without long-term current use of insulin (H)  Has diabetes.  Only  takes metformin.  Has not been checked for his A1c for several years since he does not have a primary care doctor to follow him.  Checks his blood sugar 2-3 times a day at home and reports his blood sugar runs on average  at 160 and can range from as low as 92 as high as 340.  Check basic metabolic panel and A1c.  - Basic Metabolic Panel  - Glycosylated Hemoglobin A1c    Advised not to report for work until I see him again in 1 week.  Continue to take it easy or rest and increase fluid intake.      Reviewed his visit at [facility] and reviewed labs and his chest x-ray.        FOLLOWUP in 1 week.     Chief Complaint   Establish Care (Never had primary); URI (On and off since December.  Consistant since mid March); Fall (Dazed and confused. Did not pass out); Cough (Yellow/greyish NOT green); Fatigue; and Diabetes (Non insulin)       Patient Profile   Social History     Social History Narrative    , no children. Works for [employer]. Nonsmoker. Socially drinks alcohol.         Past Medical History   There is no problem list on file for this patient.      Past Surgical History  He has no past surgical history on file.     History of Present Illness   This 53 y.o. old male is here for the first time.  Wants to establish care with me.  Reports he has been suffering from cough for several weeks started in December which h resolved and came back again 2 or 3 times.  Was apparently seen in urgent care 4 times in one time in the emergency room of [facility].  Was seen on 3/21/2019 at [facility] ER and was workup.  Had normal chest x-ray, CBC, normal BMP but slightly increased creatinine.  Was treated with cough medications without antibiotic.  Has diabetes which makes him immunocompromised.  Takes metformin.  Has not been checked for his A1c for years because he does not have primary care MD.  Checks his blood sugar, however, at home and report on average blood sugar is in the 160 but can go as low as 90 and a size 340.  Has been feeling rundown and tired because of his URI symptoms.  Apparently took too much of the DayQuil which made him weak and dazed and almost fell.  But he was able to catch himself.  Did not lose consciousness.  Still continues to feel weak.  Lost some weight because he does not have appetite to eat.    Review of Systems   A 12 point comprehensive review of systems was negative except as noted.         Medications and Allergies   Current Outpatient Medications   Medication Sig     blood glucose test (ACCU-CHEK SMARTVIEW TEST STRIP) strips Dispense item covered by pt ins. 250.02 NIDDM type II, uncontrolled - Test 3 times/day. Reason: High A1C     CONTOUR  NEXT EZ METER Misc      CONTOUR NEXT TEST STRIPS strips      metFORMIN (GLUCOPHAGE-XR) 500 MG 24 hr tablet Take 1,000 mg by mouth.     codeine-guaiFENesin (GUAIFENESIN AC)  mg/5 mL liquid Take 5 mL by mouth 3 (three) times a day as needed for cough.     levoFLOXacin (LEVAQUIN) 500 MG tablet Take 1 tablet (500 mg total) by mouth daily for 10 days.     No Known Allergies     Family and Social History   No family history on file.     Social History     Tobacco Use     Smoking status: Never Smoker     Smokeless tobacco: Never Used   Substance Use Topics     Alcohol use: Not on file     Drug use: Not on file        Physical Exam   Physical Exam  /72 (Patient Site: Right Arm, Patient Position: Sitting, Cuff Size: Adult Regular)   Pulse (!) 54   Temp 99.4  F (37.4  C)   Wt 172 lb 1.9 oz (78.1 kg)   SpO2 98%     General Appearance:    Alert, cooperative, no distress, appears stated age   Head:    Normocephalic, without obvious abnormality, atraumatic   Eyes:    PERRL, conjunctiva/corneas clear, EOM's intact, fundi     benign, both eyes        Ears:    Normal TM's and external ear canals, both ears   Nose:   Nares normal, septum midline, mucosa normal, no drainage    or sinus tenderness   Throat:   Lips, mucosa, and tongue normal; teeth and gums normal   Neck:   Supple, symmetrical, trachea midline, no adenopathy;        thyroid:  No enlargement/tenderness/nodules; no carotid    bruit or JVD   Back:     Symmetric, no curvature, ROM normal, no CVA tenderness   Lungs:     Clear to auscultation bilaterally, respirations unlabored   Chest wall:    No tenderness or deformity   Heart:    Regular rate and rhythm, S1 and S2 normal, no murmur, rub   or gallop   Abdomen:     Soft, non-tender, bowel sounds active all four quadrants,     no masses, no organomegaly   Extremities:   Extremities normal, atraumatic, no cyanosis or edema   Pulses:   2+ and symmetric all extremities   Skin:   Skin color, texture, turgor  normal, no rashes or lesions   Lymph nodes:   Cervical, supraclavicular, and axillary nodes normal   Neurologic:   CNII-XII intact. Normal strength, sensation and reflexes       throughout        Additional Information     Time: total time spent with the patient was 45 minutes of which >50% was spent in counseling and coordination of care     Stan Hoang MD  Internal Medicine  Contact me at [phone]

## 2021-05-27 NOTE — PROGRESS NOTES
"Assessment: Andrea Gamez) is here today for a consult regarding his diabetes management with A1c presently not at ADA goal of <7.0 % He arrived unaccompanied and brought his meter and log book with him. Per report, Franco cheung  has been checking BG twice daily and states he is taking all medications daily as prescribed with no missed or skipped doses. Per chart review DM originally diagnosed in 2001 and prior to [facility] to [facility] Andrea had been seeing CDE with [facility] for diabetes care and management - last visit was 7/2018 - Medications at that time were Metformin 2000 mg PO QD, and 24 units Lantus SC QD  He reported today he realizes his diabetes has \"gotten out of control\" and needs to get himself \"refocused and back on track.\"    Current diabetes medications:  Metformin 1000 mg prescribed - pt is taking 1500 mg QD, Lantus 10 units SC QD    BG Summary: Patient is checking blood glucose each morning before breakfast and again in the evening sometime between dinner and bedtime -   4/8  364  4/7  327 394  4/6  380 340  4/5  401 370  These numbers were reviewed and discussed with Franco today - suspect elevations attributed to a combination of health issues, poor food choices/ nutrition and minimal dose of basal insulin ( 10 units)     Nutrition: Currently eating 3 meals each day    B: egg/ sausage/ 1 slice bread   H2O  L: typically a sandwich with lots of meat   D: often fast food - [employer] , [employer] etc...   HS chips/veggies  Alcohol - rarely , never more than 2 beers in one sitting   Beverages; water, diet soda, milk occasionally   Has good grasp on identification of foods affecting BG and using grams/choices to determine foods amounts with meal planning    Activity: Currently outside of work Franco has been relatively sedentary due to health issues- per his report he has been ill for the last 4 months with influenza, staph infection in his shoulder, and just overall fatigue and malaise. Works as a  " for [employer] which is physically active on a daily basis- did report he has been experiencing dizziness with walking which also has inhibited his activity.  Plans on playing softball in summer    Plan: Check blood sugars 2-3 each day , Franco was reminded to bring meter and log book to all follow up appointments for review. Eat three balanced meals each day following the parameters for intake for all meals and snacks from ADA guidelines. Keep active - goal being 30 minutes daily of moderate activity. Medications: Based upon IDC Clinical guidelines for Adult Diabetes Management , using pts current A1c and wt ( 80 kg x 0.2 units/ kg ) recommend increasing basal insulin to 16 units to start,and then titrating up by 2 units every other day until morning fasting numbers are less than 130 mg/dL. Also would recommend increasing Metformin to 4 tablets daily  .   Patient has appointment with PCP later today and was instructed to follow up with diabetes educator in 4-6 weeks. Franco was advised to call with any questions/concerns that may come up before then.      Subjective and Objective:      Andrea Godfrey Jr. has been referred by Dr Hoang for Diabetes Education.     Lab Results   Component Value Date    HGBA1C 16.0 (H) 04/01/2019       Wt 177 lb (80.3 kg)   BMI 26.14 kg/m          Goals        General      Monitor       Check blood sugars 2-3  times each day until therapeutic goals are met  remember to bring meter and log book to all appointments   4/8/2019            Nutrition       Eat 3 balanced meals each day - Monitor carb intake and limit to 60-75 grams or 4-5 choices per meal    Do not wait longer than 4-5 hours to eat something  Snacks  1-2 choices  ( 15 - 30 grams)   4/8/2019              Follow up:   CDE (certified diabetic educator)      Education:     Monitoring   Meter (per above goals): Assessed and Discussed  Monitoring: Assessed, Discussed and Literature provided  BG goals: Assessed, Discussed  and Literature provided    Nutrition Management  Nutrition Management: Assessed and Discussed  Weight: Assessed and Discussed  Portions/Balance: Assessed, Discussed and Literature provided  Carb ID/Count: Assessed, Discussed and Literature provided  Label Reading: Discussed  Heart Healthy Fats: Discussed  Menu Planning: Assessed, Discussed and Literature provided  Dining Out: Assessed, Discussed and Needs instruction/review at follow-up  Physical Activity: Assessed, Discussed and Needs instruction/review at follow-up  Medications: Assessed and Discussed  Orals: Assessed and Discussed  Injected Medications: Assessed and Discussed   Storage/Exp:Discussed       Diabetes Disease Process: Assessed and Discussed    Acute Complications: Prevent, Detect, Treat:  Hypoglycemia: Assessed and Discussed  Hyperglycemia: Assessed, Discussed and Needs instruction/review at follow-up  Sick Days: Discussed      Chronic Complications  Foot Care:Not addressed  Skin Care: Not addressed  Eye: Assessed and Discussed  ABC: Assessed and Discussed  Teeth:Assessed and Discussed  Goal Setting and Problem Solving: Assessed and Discussed  Barriers: Assessed, Discussed and Needs instruction/review at follow-up  Psychosocial Adjustments: Assessed and Discussed      Time spent with the patient: 60 minutes for diabetes education and counseling.   Previous Education: yes  Visit Type:DSMT      Aishwarya Sage RN CDE  Diabetes Education  4/8/2019
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
## 2021-05-28 NOTE — PROGRESS NOTES
Office Visit - Follow Up   Andrea Godfrey Jr.   54 y.o. male    Date of Visit: 4/23/2019    Chief Complaint   Patient presents with     Follow-up     Needs eye exam and foot exam. not fasting, unable to sleep, and complains of intermittent headaches and dizziness when up and walking         Assessment and Plan   1. Type 2 diabetes mellitus with hyperglycemia, with long-term current use of insulin (H)  Now better controlled with the institution of glargine at increasing doses  and metformin.  Takes glargine 20 units at bedtime and metformin 1000 mg twice a day.  Checks his blood sugar twice a day and drank between 119-200 in the morning and between 140-200 in the evening.  Needs his yearly eye exam because of his diabetes.  He also needs his foot exam which I did including monofilament test which is normal.  Will refer to ophthalmology.  - Ambulatory referral to Ophthalmology    2. Primary insomnia  Has primary insomnia.  Unable to fall asleep.  Only sleeps on average 2-3 hours at night.  Will try zolpidem and see if this works.  - zolpidem (AMBIEN CR) 12.5 MG CR tablet; Take 1 tablet (12.5 mg total) by mouth at bedtime as needed for sleep.  Dispense: 30 tablet; Refill: 0    Letter was written releasing him to go back to work full-time without restrictions.      Follow up in 6 weeks for his diabetes and insomnia.  Advised to fast this visit.     History of Present Illness   This 54 y.o. old male is here for follow-up.  Has diabetes.  Now better controlled with reinstitution of glargine and metformin.  Tolerating his glargine and metformin.  Also was seen by diabetic educator.  Will follow up with our diabetic educator in 6 weeks.  Due for his yearly eye exam and foot exam due to his diabetes.  Only complains of insomnia.  Unable to fall asleep and once he falls asleep only sleeps for 2 to 3 hours on average.  Overall however, he is doing and feeling well now.  His cough is resolved.    Review of Systems   A 12  "point comprehensive review of systems was negative except as noted..     Medications, Allergies and Problem List   Reviewed and updated             Chief Complaint   Follow-up (Needs eye exam and foot exam. not fasting, unable to sleep, and complains of intermittent headaches and dizziness when up and walking )       Patient Profile   Social History     Social History Narrative    , no children. Works for [employer]. Nonsmoker. Socially drinks alcohol.         Past Medical History   Patient Active Problem List   Diagnosis     Type 2 diabetes mellitus with hyperglycemia, with long-term current use of insulin (H)       Past Surgical History  He has no past surgical history on file.       Medications and Allergies   Current Outpatient Medications   Medication Sig     blood glucose test (ACCU-CHEK SMARTVIEW TEST STRIP) strips Dispense item covered by pt ins. 250.02 NIDDM type II, uncontrolled - Test 3 times/day. Reason: High A1C     CONTOUR NEXT EZ METER Misc Testing three times per day           CONTOUR NEXT TEST STRIPS strips Test three times per day           insulin glargine (LANTUS) 100 unit/mL injection Inject 10 Units under the skin at bedtime. 11.65 Type 2 with hyperglycemia (Patient taking differently: Inject 22 Units under the skin at bedtime. 11.65 Type 2 with hyperglycemia      )     metFORMIN (GLUCOPHAGE-XR) 500 MG 24 hr tablet Take 1,000 mg by mouth 2 (two) times a day.            zolpidem (AMBIEN CR) 12.5 MG CR tablet Take 1 tablet (12.5 mg total) by mouth at bedtime as needed for sleep.     No Known Allergies     Family and Social History   No family history on file.     Social History     Tobacco Use     Smoking status: Never Smoker     Smokeless tobacco: Never Used   Substance Use Topics     Alcohol use: Not on file     Drug use: Not on file        Physical Exam       Physical Exam  /60   Pulse 80   Ht 5' 9\" (1.753 m)   Wt 186 lb (84.4 kg)   SpO2 99%   BMI 27.47 kg/m  "   General appearance: alert, appears stated age, cooperative and no distress  Head: Normocephalic, without obvious abnormality, atraumatic  Throat: lips, mucosa, and tongue normal; teeth and gums normal  Neck: no adenopathy, no carotid bruit, no JVD, supple, symmetrical, trachea midline and thyroid not enlarged, symmetric, no tenderness/mass/nodules  Lungs: clear to auscultation bilaterally  Heart: regular rate and rhythm, S1, S2 normal, no murmur, click, rub or gallop  Abdomen: soft, non-tender; bowel sounds normal; no masses,  no organomegaly  Extremities: extremities normal, atraumatic, no cyanosis or edema  Skin: Skin color, texture, turgor normal. No rashes or lesions  Neurologic: Grossly normal     Additional Information        Stan Hoang MD  Internal Medicine  Contact me at [phone]     Additional Information   Current Outpatient Medications   Medication Sig     blood glucose test (ACCU-CHEK SMARTVIEW TEST STRIP) strips Dispense item covered by pt ins. 250.02 NIDDM type II, uncontrolled - Test 3 times/day. Reason: High A1C     CONTOUR NEXT EZ METER Misc Testing three times per day           CONTOUR NEXT TEST STRIPS strips Test three times per day           insulin glargine (LANTUS) 100 unit/mL injection Inject 10 Units under the skin at bedtime. 11.65 Type 2 with hyperglycemia (Patient taking differently: Inject 22 Units under the skin at bedtime. 11.65 Type 2 with hyperglycemia      )     metFORMIN (GLUCOPHAGE-XR) 500 MG 24 hr tablet Take 1,000 mg by mouth 2 (two) times a day.            zolpidem (AMBIEN CR) 12.5 MG CR tablet Take 1 tablet (12.5 mg total) by mouth at bedtime as needed for sleep.     No Known Allergies  Social History     Tobacco Use     Smoking status: Never Smoker     Smokeless tobacco: Never Used   Substance Use Topics     Alcohol use: Not on file     Drug use: Not on file         Time: total time spent with the patient was 25 minutes of which >50% was spent in counseling and  coordination of care

## 2021-05-29 NOTE — TELEPHONE ENCOUNTER
Left a message to call back. Please relay results and recommendations below to patient when he calls back.    Lipids are increased specifically total cholesterol and triglycerides.  Please start atorvastatin 20 mg daily.  Prescription was sent to your pharmacy.  Your diabetes is still not controlled, A1c of 11.1, blood sugar of 220 and protein spillage in the urine due to your diabetes.  Please increase Lantus to 26 units at bedtime and continue metformin.  Continue to follow our diabetic educator for further adjustment of your Lantus. Continue  all your other medications.  Thank you.      Danii Natarajan LPN

## 2021-05-29 NOTE — TELEPHONE ENCOUNTER
"Medication Question or Clarification  Who is calling: Pharmacy: [facility]  What medication are you calling about? (include dose and sig) Lantus 10 units at bedtime.   Who prescribed the medication?: Stan Hoang MD  What is your question/concern?: Patient has reported to his pharmacy that he is currently taking 22 units at bedtime and this is listed under \"Patient taking differently\" on his last prescription. However, there is only documentation regarding his Lantus being increased to 20 units daily, as noted below during his office visit on 6/04/19. Please send a new prescription with the correct current dose to the pharmacy.      \"1. Type 2 diabetes mellitus with hyperglycemia, with long-term current use of insulin (H)  Better controlled with Lantus 20 units at bedtime and metformin 1000 mg 2 times a day.  Followed regularly by our diabetic educator.  Lantus was gradually titrated up by the diabetic educator.  Blood sugars  run less than 200.  A1c on his initial visit was very high at 16 on 4/1/2019 without treatment. Stopped taking his metformin and Lantus at that time  and these were resumed after his visit.  Fasting today.  Needs repeat A1c.  Check A1c, urine microalbumin and basic basic metabolic panel.  - Glycosylated Hemoglobin A1c  - Microalbumin, Random Urine  - Basic Metabolic Panel\"    Pharmacy: [facility].  Okay to leave a detailed message?: No  Site CMT - Please call the pharmacy to obtain any additional needed information.  "

## 2021-05-29 NOTE — TELEPHONE ENCOUNTER
Spoke with Malgorzata at [facility] pharmacy and relayed message below from Dr. Hoang.      Left message for the patient relaying message below from Dr. Hoang.  Asked the patient to call with any further questions.  Cheri NAVAS CMA/NATHANAEL....................4:56 PM
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
## 2021-06-01 NOTE — PROGRESS NOTES
"  Office Visit - Follow Up   Andrea Godfrey Jr.   54 y.o. male    Date of Visit: 10/1/2019    Chief Complaint   Patient presents with     Follow-up     fasting, will do flu shot with work, complains of headaches and dizziness starting at base of neck- has stop working and sit down     Immunizations     1st Shingles vaccine         Assessment and Plan   1. Type 2 diabetes mellitus without complication, with long-term current use of insulin (H)  No issues good blood sugar readings in the 70s to the 130s occasionally in the 150s with glargine and metformin.  Was seen by our diabetic educator and had teaching and treatment for his diabetes.  Check A1c and basic metabolic panel.  - insulin glargine (LANTUS SOLOSTAR PEN) 100 unit/mL (3 mL) pen; Inject 28 Units under the skin at bedtime.  Dispense: 5 adj dose pen; Refill: 11  - pen needle, diabetic (TRUEPLUS PEN NEEDLE) 31 gauge x 5/16\" Ndle; Use one per day for lantus  Dispense: 100 each; Refill: 3  - Glycosylated Hemoglobin A1c  - Basic Metabolic Panel    2. Mixed hyperlipidemia  Controlled.  Continue atorvastatin for lipids were good on 6/4/2019.  Fasting today.  Check lipids, TSH and liver function.  - Lipid Cascade  - Thyroid Stimulating Hormone (TSH)  - Hepatic Profile    3. Essential hypertension  Has increased blood pressure which I think because his headaches and occasional dizziness.  We will treat him with lisinopril 5 mg daily.  Advised patient cause of his headaches and dizziness is his increased blood pressure.  Recheck in 2 months.  - lisinopril (PRINIVIL,ZESTRIL) 5 MG tablet; Take 1 tablet (5 mg total) by mouth daily.  Dispense: 90 tablet; Refill: 3    4. Need for zoster vaccination  Wants to shingle vaccination.  Was given first dose of Shingrix today.  - Varicella Zoster, Recombinant Vaccine IM      Follow up in 2 months.     History of Present Illness   This 54 y.o. old male is here for follow-up of his diabetes.  Had increased A1c of 11 in June.  " "Was started on glargine and continued his metformin but at an increased dose of 1000 mg twice a day.  Checks his blood sugar and now runs in the 70s to 130s occasionally in the 150s.  Has hyperlipemia over atorvastatin.  Complains of intermittent headaches and dizziness.  Now shows increased blood pressure.  Headaches and dizziness are due to his hypertension.  Needs treatment for this.  Overall, feels well.  No complaints.    Review of Systems   A 12 point comprehensive review of systems was negative except as noted..     Medications, Allergies and Problem List   Reviewed and updated             Chief Complaint   Follow-up (fasting, will do flu shot with work, complains of headaches and dizziness starting at base of neck- has stop working and sit down) and Immunizations (1st Shingles vaccine )       Patient Profile   Social History     Patient does not qualify to have social determinant information on file (likely too young).   Social History Narrative    , no children. Works for [employer]. Nonsmoker. Socially drinks alcohol.         Past Medical History   Patient Active Problem List   Diagnosis     Type 2 diabetes mellitus with hyperglycemia, with long-term current use of insulin (H)       Past Surgical History  He has no past surgical history on file.       Medications and Allergies   Current Outpatient Medications   Medication Sig     atorvastatin (LIPITOR) 20 MG tablet Take 1 tablet (20 mg total) by mouth daily.     blood glucose test (ACCU-CHEK SMARTVIEW TEST STRIP) strips Dispense item covered by pt ins. 250.02 NIDDM type II, uncontrolled - Test 3 times/day. Reason: High A1C     CONTOUR NEXT EZ METER Misc Testing three times per day           CONTOUR NEXT TEST STRIPS strips Test three times per day           metFORMIN (GLUCOPHAGE-XR) 500 MG 24 hr tablet Take 1,000 mg by mouth 2 (two) times a day.            pen needle, diabetic (TRUEPLUS PEN NEEDLE) 31 gauge x 5/16\" Ndle Use one per day " "for lantus     insulin glargine (LANTUS SOLOSTAR PEN) 100 unit/mL (3 mL) pen Inject 28 Units under the skin at bedtime.     lisinopril (PRINIVIL,ZESTRIL) 5 MG tablet Take 1 tablet (5 mg total) by mouth daily.     No Known Allergies     Family and Social History   No family history on file.     Social History     Tobacco Use     Smoking status: Never Smoker     Smokeless tobacco: Never Used   Substance Use Topics     Alcohol use: Not on file     Drug use: Not on file        Physical Exam       Physical Exam  /84   Pulse 81   Ht 5' 9\" (1.753 m)   Wt 182 lb (82.6 kg)   SpO2 99%   BMI 26.88 kg/m    General appearance: alert, appears stated age, cooperative and no distress  Head: Normocephalic, without obvious abnormality, atraumatic  Throat: lips, mucosa, and tongue normal; teeth and gums normal  Neck: no adenopathy, no carotid bruit, no JVD, supple, symmetrical, trachea midline and thyroid not enlarged, symmetric, no tenderness/mass/nodules  Lungs: clear to auscultation bilaterally  Heart: regular rate and rhythm, S1, S2 normal, no murmur, click, rub or gallop  Abdomen: soft, non-tender; bowel sounds normal; no masses,  no organomegaly  Extremities: extremities normal, atraumatic, no cyanosis or edema  Skin: Skin color, texture, turgor normal. No rashes or lesions  Neurologic: Grossly normal     Additional Information        Stan Hoang MD  Internal Medicine  Contact me at [phone]     Additional Information   Current Outpatient Medications   Medication Sig     atorvastatin (LIPITOR) 20 MG tablet Take 1 tablet (20 mg total) by mouth daily.     blood glucose test (ACCU-CHEK SMARTVIEW TEST STRIP) strips Dispense item covered by pt ins. 250.02 NIDDM type II, uncontrolled - Test 3 times/day. Reason: High A1C     CONTOUR NEXT EZ METER Misc Testing three times per day           CONTOUR NEXT TEST STRIPS strips Test three times per day           metFORMIN (GLUCOPHAGE-XR) 500 MG 24 hr tablet Take 1,000 mg " "by mouth 2 (two) times a day.            pen needle, diabetic (TRUEPLUS PEN NEEDLE) 31 gauge x 5/16\" Ndle Use one per day for lantus     insulin glargine (LANTUS SOLOSTAR PEN) 100 unit/mL (3 mL) pen Inject 28 Units under the skin at bedtime.     lisinopril (PRINIVIL,ZESTRIL) 5 MG tablet Take 1 tablet (5 mg total) by mouth daily.     No Known Allergies  Social History     Tobacco Use     Smoking status: Never Smoker     Smokeless tobacco: Never Used   Substance Use Topics     Alcohol use: Not on file     Drug use: Not on file         Time: total time spent with the patient was 25 minutes of which >50% was spent in counseling and coordination of care     "

## 2021-06-02 VITALS — HEIGHT: 69 IN | WEIGHT: 186 LBS | BODY MASS INDEX: 27.55 KG/M2

## 2021-06-02 VITALS — WEIGHT: 177 LBS | BODY MASS INDEX: 26.14 KG/M2

## 2021-06-02 VITALS — WEIGHT: 182 LBS | BODY MASS INDEX: 26.96 KG/M2 | HEIGHT: 69 IN

## 2021-06-02 VITALS — HEIGHT: 69 IN | WEIGHT: 176 LBS | BODY MASS INDEX: 26.07 KG/M2

## 2021-06-02 VITALS — WEIGHT: 183 LBS | BODY MASS INDEX: 27.11 KG/M2 | HEIGHT: 69 IN

## 2021-06-02 VITALS — WEIGHT: 172.12 LBS

## 2021-06-02 NOTE — TELEPHONE ENCOUNTER
Left a detailed message with patient. Relayed results and recommendations below from Md. Letter mailed to patient.    You  still have uncontrolled diabetes, A1c of 12.9.  Because of your uncontrolled diabetes you have abnormal kidney function.  Please see me again for your uncontrolled diabetes.     Rest of your labs are normal specifically lipids.  Thank you.    Danii Natarajan LPN

## 2021-06-03 VITALS
HEIGHT: 69 IN | OXYGEN SATURATION: 97 % | DIASTOLIC BLOOD PRESSURE: 60 MMHG | BODY MASS INDEX: 26.81 KG/M2 | WEIGHT: 181 LBS | HEART RATE: 80 BPM | SYSTOLIC BLOOD PRESSURE: 120 MMHG

## 2021-06-03 VITALS
DIASTOLIC BLOOD PRESSURE: 84 MMHG | HEIGHT: 69 IN | SYSTOLIC BLOOD PRESSURE: 140 MMHG | WEIGHT: 182 LBS | OXYGEN SATURATION: 99 % | BODY MASS INDEX: 26.96 KG/M2 | HEART RATE: 81 BPM

## 2021-06-04 NOTE — PROGRESS NOTES
"  Office Visit - Physical Exam   Andrea Godfrey Jr.   54 y.o. male    Date of Visit: 12/19/2019    Chief Complaint   Patient presents with     Annual Exam     not fasting, 2nd shingrix         Assessment and Plan   1. Routine general medical examination at a health care facility  Advised his comprehensive physical exam is normal.  - PSA, Annual Screen (Prostatic-Specific Antigen)    2. Type 2 diabetes mellitus without complication, with long-term current use of insulin (H)  Not controlled.  Last A1c was 12.9 on 10/1/2019.  Advised to increase his Lantus to 30 units at bedtime and continue metformin.  Want him to see endocrinology.  Will refer him to endocrinology for his and uncontrolled diabetes.  Check A1c and basic metabolic panel.  - pen needle, diabetic (TRUEPLUS PEN NEEDLE) 31 gauge x 5/16\" Ndle; Use one per day for lantus  Dispense: 100 each; Refill: 3  - lancets (ONETOUCH DELICA LANCETS) 30 gauge Misc; Testing once per day  Dispense: 100 each; Refill: 0  - CONTOUR NEXT TEST STRIPS strips; Testing once per day  Dispense: 100 strip; Refill: 3  - metFORMIN (GLUCOPHAGE-XR) 500 MG 24 hr tablet; Take 2 tablets (1,000 mg total) by mouth 2 (two) times a day.  Dispense: 360 tablet; Refill: 3  - Ambulatory referral to Endocrinology  - Glycosylated Hemoglobin A1c  - Basic Metabolic Panel    3. Essential hypertension  Controlled.  Continue lisinopril.    4. Mixed hyperlipidemia  Controlled.  Last lipids were good, LDL 77, HDL 49, triglycerides 146 and total cholesterol 155 on 10/1/2019..  Continue atorvastatin.    5. Need for immunization against influenza  Flu shot was given.    6. Need for zoster vaccination  Second dose of herpes zoster was given.  - Varicella Zoster, Recombinant Vaccine IM      Follow up in 3 months.  Will fast next visit.     History of Present Illness   This 54 y.o. old male is here for comprehensive physical exam.  Reports he is doing well.  But his diabetes not controlled.  Last A1c was " "high.  Takes Lantus and metformin.  Has hypertension controlled by lisinopril.  Has upper lip controlled by atorvastatin.  Sees and hears well. Just had an eye exam with his eye doctor for his annual diabetic eye exam.  Denies chest pains and shortness of breath.  Denies urinary or bowel symptoms or disturbances.  Sleeps well.  Denies erectile dysfunction.  Sexually active with his wife.  Does not exercise regularly but he is physically active at work.  Walks daily at his work.    Review of Systems   A 12 point comprehensive review of systems was negative except as noted..     Medications, Allergies and Problem List   Reviewed and updated             Chief Complaint   Annual Exam (not fasting, 2nd shingrix )       Patient Profile   Social History     Social History Narrative    , no children. Works for [employer]. Nonsmoker. Socially drinks alcohol.         Past Medical History   Patient Active Problem List   Diagnosis     Type 2 diabetes mellitus with hyperglycemia, with long-term current use of insulin (H)       Past Surgical History  He has no past surgical history on file.       Medications and Allergies   Current Outpatient Medications   Medication Sig     atorvastatin (LIPITOR) 20 MG tablet Take 1 tablet (20 mg total) by mouth daily.     blood glucose test (ACCU-CHEK SMARTVIEW TEST STRIP) strips Dispense item covered by pt ins. 250.02 NIDDM type II, uncontrolled - Test 3 times/day. Reason: High A1C     CONTOUR NEXT EZ METER Misc Testing three times per day           CONTOUR NEXT TEST STRIPS strips Testing once per day     insulin glargine (LANTUS SOLOSTAR PEN) 100 unit/mL (3 mL) pen Inject 28 Units under the skin at bedtime.     lisinopril (PRINIVIL,ZESTRIL) 5 MG tablet Take 1 tablet (5 mg total) by mouth daily.     metFORMIN (GLUCOPHAGE-XR) 500 MG 24 hr tablet Take 2 tablets (1,000 mg total) by mouth 2 (two) times a day.     pen needle, diabetic (TRUEPLUS PEN NEEDLE) 31 gauge x 5/16\" Ndle " "Use one per day for lantus     lancets (ONETOUCH DELICA LANCETS) 30 gauge Misc Testing once per day     No Known Allergies     Family and Social History   No family history on file.     Social History     Tobacco Use     Smoking status: Never Smoker     Smokeless tobacco: Never Used   Substance Use Topics     Alcohol use: Not on file     Drug use: Not on file        Physical Exam       Physical Exam  /60   Pulse 80   Ht 5' 9\" (1.753 m)   Wt 181 lb (82.1 kg)   SpO2 97%   BMI 26.73 kg/m      General Appearance:    Alert, cooperative, no distress, appears stated age, pleasant   Head:    Normocephalic, without obvious abnormality, atraumatic   Eyes:    PERRL, conjunctiva/corneas clear, EOM's intact, fundi     benign, both eyes        Ears:    Normal TM's and external ear canals, both ears   Nose:   Nares normal, septum midline, mucosa normal, no drainage    or sinus tenderness   Throat:   Lips, mucosa, and tongue normal; teeth and gums normal   Neck:   Supple, symmetrical, trachea midline, no adenopathy;        thyroid:  No enlargement/tenderness/nodules; no carotid    bruit or JVD   Back:     Symmetric, no curvature, ROM normal, no CVA tenderness   Lungs:     Clear to auscultation bilaterally, respirations unlabored   Chest wall:    No tenderness or deformity   Heart:    Regular rate and rhythm, S1 and S2 normal, no murmur, rub   or gallop   Abdomen:     Soft, non-tender, bowel sounds active all four quadrants,     no masses, no organomegaly   Genitalia:    Normal male without lesion, discharge or tenderness, circumcised   Rectal:    Normal tone, normal prostate, no masses or tenderness   Extremities:   Extremities normal, atraumatic, no cyanosis or edema   Pulses:   2+ and symmetric all extremities   Skin:   Skin color, texture, turgor normal, no rashes or lesions   Lymph nodes:   Cervical, supraclavicular, and axillary nodes normal   Neurologic:   CNII-XII intact. Normal strength, sensation and reflexes   " "    throughout        Additional Information   Post Discharge Medication Reconciliation Status: discharge medications reconciled and changed, per note/orders (see AVS)      Stan Hoang MD  Internal Medicine  Contact me at [phone]     Additional Information   Current Outpatient Medications   Medication Sig     atorvastatin (LIPITOR) 20 MG tablet Take 1 tablet (20 mg total) by mouth daily.     blood glucose test (ACCU-CHEK SMARTVIEW TEST STRIP) strips Dispense item covered by pt ins. 250.02 NIDDM type II, uncontrolled - Test 3 times/day. Reason: High A1C     CONTOUR NEXT EZ METER Misc Testing three times per day           CONTOUR NEXT TEST STRIPS strips Testing once per day     insulin glargine (LANTUS SOLOSTAR PEN) 100 unit/mL (3 mL) pen Inject 28 Units under the skin at bedtime.     lisinopril (PRINIVIL,ZESTRIL) 5 MG tablet Take 1 tablet (5 mg total) by mouth daily.     metFORMIN (GLUCOPHAGE-XR) 500 MG 24 hr tablet Take 2 tablets (1,000 mg total) by mouth 2 (two) times a day.     pen needle, diabetic (TRUEPLUS PEN NEEDLE) 31 gauge x 5/16\" Ndle Use one per day for lantus     lancets (ONETOUCH DELICA LANCETS) 30 gauge Misc Testing once per day     No Known Allergies  Social History     Tobacco Use     Smoking status: Never Smoker     Smokeless tobacco: Never Used   Substance Use Topics     Alcohol use: Not on file     Drug use: Not on file        "

## 2021-06-04 NOTE — TELEPHONE ENCOUNTER
Type of referral:  Endo    What provider or facility are you being referred to?  [facility]    Do you have an appointment scheduled?  Yes 03/10/2020    What is your question?    Clinic needs face sheet for this patient, he will be a new patient and needs the face sheet prior to visit.    Okay to leave a detailed message: Yes     Please fax face sheet ASAP to:  FX: [phone]

## 2021-06-07 NOTE — TELEPHONE ENCOUNTER
Left message to call back for: Andrea  Information to relay to patient:  Tried calling pt's cell phone and there was no answer. Called pt's home phone and wife said that pt was on his way down to the clinic. Will redirect pt when he arrives to clinic.

## 2021-06-12 NOTE — TELEPHONE ENCOUNTER
Refills Approved x 2    Rx renewed per Medication Renewal Policy. Medication was last renewed on 10/01/2019-lisinopril, 06/05/2019-atorvastatin.  Last office visit was 12/19/2019. No upcoming appointment on file. Note sent to pharmacy.    Vidhya Shaw, [facility] Triage/Med Refill 11/8/2020     Requested Prescriptions   Pending Prescriptions Disp Refills     lisinopriL (PRINIVIL,ZESTRIL) 5 MG tablet [Pharmacy Med Name: LISINOPRIL 5 MG TABS 5 Tablet] 90 tablet 3     Sig: TAKE ONE TABLET BY MOUTH DIALY       Ace Inhibitors Refill Protocol Passed - 11/5/2020 11:30 AM        Passed - PCP or prescribing provider visit in past 12 months       Last office visit with prescriber/PCP: 10/1/2019 Stan Hoang MD OR same dept: Visit date not found OR same specialty: 10/1/2019 Stan Hoang MD  Last physical: 12/19/2019 Last MTM visit: Visit date not found   Next visit within 3 mo: Visit date not found  Next physical within 3 mo: Visit date not found  Prescriber OR PCP: Stan Hoang MD  Last diagnosis associated with med order: 1. Essential hypertension  - lisinopriL (PRINIVIL,ZESTRIL) 5 MG tablet [Pharmacy Med Name: LISINOPRIL 5 MG TABS 5 Tablet]; TAKE ONE TABLET BY MOUTH DIALY  Dispense: 90 tablet; Refill: 3    2. Mixed hyperlipidemia  - atorvastatin (LIPITOR) 20 MG tablet [Pharmacy Med Name: ATORVASTATIN 20 MG TABLET 20 Tablet]; TAKE 1 TABLET BY MOUTH ONCE DAILY  Dispense: 90 tablet; Refill: 3    If protocol passes may refill for 12 months if within 3 months of last provider visit (or a total of 15 months).             Passed - Serum Potassium in past 12 months     Lab Results   Component Value Date    Potassium 4.7 12/19/2019             Passed - Blood pressure filed in past 12 months     BP Readings from Last 1 Encounters:   12/19/19 120/60             Passed - Serum Creatinine in past 12 months     Creatinine   Date Value Ref Range Status   12/19/2019 1.43 (H) 0.70 - 1.30 mg/dL Final                 atorvastatin (LIPITOR) 20 MG tablet [Pharmacy Med Name: ATORVASTATIN 20 MG TABLET 20 Tablet] 90 tablet 3     Sig: TAKE 1 TABLET BY MOUTH ONCE DAILY       Statins Refill Protocol (Hmg CoA Reductase Inhibitors) Passed - 11/5/2020 11:30 AM        Passed - PCP or prescribing provider visit in past 12 months      Last office visit with prescriber/PCP: 10/1/2019 Stan Hoang MD OR same dept: Visit date not found OR same specialty: 10/1/2019 Stna Hoang MD  Last physical: 12/19/2019 Last MTM visit: Visit date not found   Next visit within 3 mo: Visit date not found  Next physical within 3 mo: Visit date not found  Prescriber OR PCP: Stan Hoang MD  Last diagnosis associated with med order: 1. Essential hypertension  - lisinopriL (PRINIVIL,ZESTRIL) 5 MG tablet [Pharmacy Med Name: LISINOPRIL 5 MG TABS 5 Tablet]; TAKE ONE TABLET BY MOUTH DIALY  Dispense: 90 tablet; Refill: 3    2. Mixed hyperlipidemia  - atorvastatin (LIPITOR) 20 MG tablet [Pharmacy Med Name: ATORVASTATIN 20 MG TABLET 20 Tablet]; TAKE 1 TABLET BY MOUTH ONCE DAILY  Dispense: 90 tablet; Refill: 3    If protocol passes may refill for 12 months if within 3 months of last provider visit (or a total of 15 months).

## 2021-06-16 PROBLEM — E11.65 TYPE 2 DIABETES MELLITUS WITH HYPERGLYCEMIA, WITH LONG-TERM CURRENT USE OF INSULIN (H): Status: ACTIVE | Noted: 2019-04-08

## 2021-06-16 PROBLEM — Z79.4 TYPE 2 DIABETES MELLITUS WITH HYPERGLYCEMIA, WITH LONG-TERM CURRENT USE OF INSULIN (H): Status: ACTIVE | Noted: 2019-04-08

## 2021-06-19 NOTE — LETTER
Letter by Stan Hoang MD at      Author: Stan Hoang MD Service: -- Author Type: --    Filed:  Encounter Date: 4/23/2019 Status: (Other)         April 23, 2019     Patient: Andrea Godfrey Jr.   YOB: 1965   Date of Visit: 4/23/2019       To Whom it May Concern:    Andrea Godfrey was seen in my clinic on 4/23/2019. Ok to return to work without restrictions.    If you have any questions or concerns, please don't hesitate to call.    Sincerely,         Electronically signed by Stan Hoang MD

## 2021-06-19 NOTE — LETTER
Letter by Stan Hoang MD at      Author: Stan Hoang MD Service: -- Author Type: --    Filed:  Encounter Date: 6/5/2019 Status: (Other)         Andrea Godfrey Jr.  [address] [facility] [address]             June 5, 2019         Dear Mr. Godfrey,    Below are the results from your recent visit:    Resulted Orders   Lipid Cascade   Result Value Ref Range    Cholesterol 219 (H) <=199 mg/dL    Triglycerides 218 (H) <=149 mg/dL    HDL Cholesterol 46 >=40 mg/dL    LDL Calculated 129 <=129 mg/dL    Patient Fasting > 8hrs? Yes    Glycosylated Hemoglobin A1c   Result Value Ref Range    Hemoglobin A1c 11.1 (H) 3.5 - 6.0 %   Microalbumin, Random Urine   Result Value Ref Range    Microalbumin, Random Urine 9.36 (H) 0.00 - 1.99 mg/dL    Creatinine, Urine 169.6 mg/dL    Microalbumin/Creatinine Ratio Random Urine 55.2 (H) <=19.9 mg/g    Narrative    Microalbumin, Random Urine  <2.0 mg/dL . . . . . . . . Normal  3.0-30.0 mg/dL . . . . . . Microalbuminuria  >30.0 mg/dL . . . . . .  . Clinical Proteinuria  Microalbumin/Creatinine Ratio, Random Urine  <20 mg/g . . . . .. . . . Normal   mg/g . . . . . . . Microalbuminuria  >300 mg/g . . . . . . . . Clinical Proteinuria   Basic Metabolic Panel   Result Value Ref Range    Sodium 139 136 - 145 mmol/L    Potassium 4.7 3.5 - 5.0 mmol/L    Chloride 105 98 - 107 mmol/L    CO2 23 22 - 31 mmol/L    Anion Gap, Calculation 11 5 - 18 mmol/L    Glucose 220 (H) 70 - 125 mg/dL    Calcium 10.1 8.5 - 10.5 mg/dL    BUN 30 (H) 8 - 22 mg/dL    Creatinine 1.13 0.70 - 1.30 mg/dL    GFR MDRD Af Amer >60 >60 mL/min/1.73m2    GFR MDRD Non Af Amer >60 >60 mL/min/1.73m2    Narrative    Fasting Glucose reference range is 70-99 mg/dL per  American Diabetes Association (ADA) guidelines.   Hepatic Profile   Result Value Ref Range    Bilirubin, Total 0.4 0.0 - 1.0 mg/dL    Bilirubin, Direct 0.2 <=0.5 mg/dL    Protein, Total 7.2 6.0 - 8.0 g/dL    Albumin 3.9 3.5 - 5.0 g/dL    Alkaline  Phosphatase 116 45 - 120 U/L    AST 11 0 - 40 U/L    ALT 15 0 - 45 U/L   HM2(CBC w/o Differential)   Result Value Ref Range    WBC 8.9 4.0 - 11.0 thou/uL    RBC 5.31 4.40 - 6.20 mill/uL    Hemoglobin 15.2 14.0 - 18.0 g/dL    Hematocrit 44.2 40.0 - 54.0 %    MCV 83 80 - 100 fL    MCH 28.6 27.0 - 34.0 pg    MCHC 34.4 32.0 - 36.0 g/dL    RDW 12.7 11.0 - 14.5 %    Platelets 235 140 - 440 thou/uL    MPV 7.8 7.0 - 10.0 fL   Uric Acid   Result Value Ref Range    Uric Acid 6.9 3.0 - 8.0 mg/dL   Thyroid Stimulating Hormone (TSH)   Result Value Ref Range    TSH 1.56 0.30 - 5.00 uIU/mL       Lipids are increased specifically total cholesterol and triglycerides.  Please start atorvastatin 20 mg daily.  Prescription was sent to your pharmacy.    Your diabetes is still not controlled, A1c of 11.1, blood sugar of 220 and protein spillage in the urine due to your diabetes.  Please increase Lantus to 26 units at bedtime and continue metformin.  Continue to follow our diabetic educator for further adjustment of your Lantus.    Continue  all your other medications.  Thank you.    Please call with questions or contact us using [employer].    Sincerely,        Electronically signed by Stan Hoang MD

## 2021-06-19 NOTE — LETTER
Letter by Stan Hoang MD at      Author: Stan Hoang MD Service: -- Author Type: --    Filed:  Encounter Date: 10/8/2019 Status: Signed         Andrea Godfrey Jr.  [address] [facility] [address]             October 8, 2019         Dear Mr. Godfrey,    Below are the results from your recent visit:    Resulted Orders   Lipid Cascade   Result Value Ref Range    Cholesterol 155 <=199 mg/dL    Triglycerides 146 <=149 mg/dL    HDL Cholesterol 49 >=40 mg/dL    LDL Calculated 77 <=129 mg/dL    Patient Fasting > 8hrs? Unknown    Glycosylated Hemoglobin A1c   Result Value Ref Range    Hemoglobin A1c 12.9 (H) 3.5 - 6.0 %   Thyroid Stimulating Hormone (TSH)   Result Value Ref Range    TSH 0.86 0.30 - 5.00 uIU/mL   Basic Metabolic Panel   Result Value Ref Range    Sodium 138 136 - 145 mmol/L    Potassium 5.4 (H) 3.5 - 5.0 mmol/L    Chloride 103 98 - 107 mmol/L    CO2 24 22 - 31 mmol/L    Anion Gap, Calculation 11 5 - 18 mmol/L    Glucose 176 (H) 70 - 125 mg/dL    Calcium 10.4 8.5 - 10.5 mg/dL    BUN 27 (H) 8 - 22 mg/dL    Creatinine 1.40 (H) 0.70 - 1.30 mg/dL    GFR MDRD Af Amer >60 >60 mL/min/1.73m2    GFR MDRD Non Af Amer 53 (L) >60 mL/min/1.73m2    Narrative    Fasting Glucose reference range is 70-99 mg/dL per  American Diabetes Association (ADA) guidelines.   Hepatic Profile   Result Value Ref Range    Bilirubin, Total 0.5 0.0 - 1.0 mg/dL    Bilirubin, Direct 0.2 <=0.5 mg/dL    Protein, Total 7.6 6.0 - 8.0 g/dL    Albumin 3.9 3.5 - 5.0 g/dL    Alkaline Phosphatase 93 45 - 120 U/L    AST 21 0 - 40 U/L    ALT 25 0 - 45 U/L       You  still have uncontrolled diabetes, A1c of 12.9.  Because of your uncontrolled diabetes you have abnormal kidney function.  Please see me again for your uncontrolled diabetes.    Rest of your labs are normal specifically lipids.  Thank you.    Please call with questions or contact us using "[employer]    Sincerely,        Electronically signed by Stan Hoang MD

## 2021-06-20 NOTE — LETTER
Letter by Stan Hoang MD at      Author: Stan Hoang MD Service: -- Author Type: --    Filed:  Encounter Date: 12/23/2019 Status: Signed         Andrea Godfrey Jr.  [address] [facility] [address]             December 23, 2019         Dear Mr. Godfrey,    Below are the results from your recent visit:    Resulted Orders   Glycosylated Hemoglobin A1c   Result Value Ref Range    Hemoglobin A1c 13.8 (H) 4.2 - 6.1 %   Basic Metabolic Panel   Result Value Ref Range    Sodium 138 136 - 145 mmol/L    Potassium 4.7 3.5 - 5.0 mmol/L    Chloride 104 98 - 107 mmol/L    CO2 25 22 - 31 mmol/L    Anion Gap, Calculation 9 5 - 18 mmol/L    Glucose 174 (H) 70 - 125 mg/dL    Calcium 9.5 8.5 - 10.5 mg/dL    BUN 21 8 - 22 mg/dL    Creatinine 1.43 (H) 0.70 - 1.30 mg/dL    GFR MDRD Af Amer >60 >60 mL/min/1.73m2    GFR MDRD Non Af Amer 52 (L) >60 mL/min/1.73m2    Narrative    Fasting Glucose reference range is 70-99 mg/dL per  American Diabetes Association (ADA) guidelines.   PSA, Annual Screen (Prostatic-Specific Antigen)   Result Value Ref Range    PSA 0.4 0.0 - 3.5 ng/mL    Narrative    Method is Abbott Prostate-Specific Antigen (PSA)  Standard-WHO 1st International (90:10)       Markedly increased A1c and bit improved blood sugar.  Both still suggest your diabetes is not controlled.  Hence, this is the reason I like you to see endocrinology, our diabetic specialist.  Please keep your appointment  we set up.  Kidney function is mildly abnormal, creatinine 1.43 due to your diabetes.  Normal rest of your labs.  Continue same medications for now.  Thanks.    Please call with questions or contact us using [employer].    Sincerely,        Electronically signed by Stan Hoang MD